# Patient Record
Sex: MALE | Race: WHITE | NOT HISPANIC OR LATINO | Employment: FULL TIME | URBAN - METROPOLITAN AREA
[De-identification: names, ages, dates, MRNs, and addresses within clinical notes are randomized per-mention and may not be internally consistent; named-entity substitution may affect disease eponyms.]

---

## 2020-12-02 ENCOUNTER — OFFICE VISIT (OUTPATIENT)
Dept: URGENT CARE | Age: 25
End: 2020-12-02
Payer: COMMERCIAL

## 2020-12-02 VITALS
BODY MASS INDEX: 19.88 KG/M2 | OXYGEN SATURATION: 99 % | HEIGHT: 73 IN | WEIGHT: 150 LBS | HEART RATE: 83 BPM | TEMPERATURE: 97.9 F | RESPIRATION RATE: 19 BRPM

## 2020-12-02 DIAGNOSIS — B34.9 ACUTE VIRAL SYNDROME: Primary | ICD-10-CM

## 2020-12-02 PROCEDURE — G0382 LEV 3 HOSP TYPE B ED VISIT: HCPCS | Performed by: FAMILY MEDICINE

## 2020-12-02 PROCEDURE — 87637 SARSCOV2&INF A&B&RSV AMP PRB: CPT

## 2020-12-05 LAB
FLUAV RNA NPH QL NAA+PROBE: NOT DETECTED
FLUBV RNA NPH QL NAA+PROBE: NOT DETECTED
RSV RNA NPH QL NAA+PROBE: NOT DETECTED
SARS-COV-2 RNA NPH QL NAA+PROBE: DETECTED

## 2022-03-03 ENCOUNTER — OFFICE VISIT (OUTPATIENT)
Dept: URGENT CARE | Age: 27
End: 2022-03-03
Payer: COMMERCIAL

## 2022-03-03 VITALS
BODY MASS INDEX: 18.55 KG/M2 | HEART RATE: 80 BPM | WEIGHT: 140 LBS | HEIGHT: 73 IN | TEMPERATURE: 98.6 F | RESPIRATION RATE: 22 BRPM | OXYGEN SATURATION: 100 %

## 2022-03-03 DIAGNOSIS — J06.9 VIRAL URI WITH COUGH: Primary | ICD-10-CM

## 2022-03-03 PROCEDURE — 99214 OFFICE O/P EST MOD 30 MIN: CPT | Performed by: FAMILY MEDICINE

## 2022-03-03 RX ORDER — METHYLPREDNISOLONE 4 MG/1
TABLET ORAL
Qty: 21 TABLET | Refills: 0 | Status: SHIPPED | OUTPATIENT
Start: 2022-03-03

## 2022-03-03 RX ORDER — BENZONATATE 200 MG/1
200 CAPSULE ORAL 3 TIMES DAILY PRN
Qty: 15 CAPSULE | Refills: 0 | Status: SHIPPED | OUTPATIENT
Start: 2022-03-03

## 2022-03-03 RX ORDER — BROMPHENIRAMINE MALEATE, PSEUDOEPHEDRINE HYDROCHLORIDE, AND DEXTROMETHORPHAN HYDROBROMIDE 2; 30; 10 MG/5ML; MG/5ML; MG/5ML
10 SYRUP ORAL 3 TIMES DAILY PRN
Qty: 200 ML | Refills: 0 | Status: SHIPPED | OUTPATIENT
Start: 2022-03-03

## 2022-03-03 NOTE — PROGRESS NOTES
[facility]        NAME: Rogelio Whitt is a 27 y o  male  : 1995    MRN: [id number]  DATE: March 3, 2022  TIME: 11:54 AM    Assessment and Plan   Viral URI with cough [J06 9]  1  Viral URI with cough  methylPREDNISolone 4 MG tablet therapy pack    brompheniramine-pseudoephedrine-DM 30-2-10 MG/5ML syrup    benzonatate (TESSALON) 200 MG capsule         Patient Instructions     Based on duration of symptoms COVID testing will not change treatment plan  Quarantine guidelines discussed  OTC supplements/medications discussed  Follow-up with PCP in the next 1-2 days for re-evaluation if symptoms continue or worsen  Go to the ED if any fevers, unable to stay hydrated, abdominal pain, chest pain, shortness of breath, wheezing, chest tightness, cough or cold symptoms, new or worsening symptoms or other concerning symptoms  Chief Complaint     Chief Complaint   Patient presents with    Cough     cough, congestion, fatigue, weakness and sinus pressure  pt has not feel well about one week  pt is not vaccinated  was postiv2020  History of Present Illness     Rogelio Whitt is a 27 y o  male presenting to the office today for upper respiratory complaints  Symptoms have been present for 7 days, and include cough, congestion, chills, fever, and muscle pain secondary to coughing  He has tried OTC decongestant and cough suppressants for his symptoms, with little relief  Sick contacts include: none  Recent travel: none    Review of Systems     Review of Systems   Constitutional: Positive for chills, fatigue and fever  HENT: Positive for congestion, postnasal drip and sore throat  Negative for ear discharge, ear pain, sinus pressure and sinus pain  Eyes: Negative for pain and discharge  Respiratory: Positive for cough  Negative for shortness of breath  Cardiovascular: Negative for chest pain and palpitations     Gastrointestinal: Negative for abdominal pain, diarrhea, nausea and vomiting  Genitourinary: Negative for difficulty urinating and dysuria  Musculoskeletal: Positive for myalgias  Negative for arthralgias  Skin: Negative for rash  Neurological: Negative for dizziness, syncope, light-headedness, numbness and headaches  Psychiatric/Behavioral: Negative for agitation  All other systems reviewed and are negative  Current Medications       Current Outpatient Medications:     benzonatate (TESSALON) 200 MG capsule, Take 1 capsule (200 mg total) by mouth 3 (three) times a day as needed for cough, Disp: 15 capsule, Rfl: 0    brompheniramine-pseudoephedrine-DM 30-2-10 MG/5ML syrup, Take 10 mL by mouth 3 (three) times a day as needed for cough or congestion, Disp: 200 mL, Rfl: 0    methylPREDNISolone 4 MG tablet therapy pack, Use as directed on package, Disp: 21 tablet, Rfl: 0    Current Allergies     Allergies as of 03/03/2022    (No Known Allergies)            The following portions of the patient's history were reviewed and updated as appropriate: allergies, current medications, past family history, past medical history, past social history, past surgical history and problem list      Past Medical History:   Diagnosis Date    Lyme disease 2016       History reviewed  No pertinent surgical history  History reviewed  No pertinent family history  Medications have been verified  Objective     Pulse 80   Temp 98 6 °F (37 °C)   Resp 22   Ht 6' 1" (1 854 m)   Wt 63 5 kg (140 lb)   SpO2 100%   BMI 18 47 kg/m²   No LMP for male patient  Physical Exam     Physical Exam  Vitals reviewed  Constitutional:       General: He is not in acute distress  Appearance: Normal appearance  He is not ill-appearing  HENT:      Head: Normocephalic and atraumatic  Right Ear: Tympanic membrane and ear canal normal  No middle ear effusion  Left Ear: Tympanic membrane and ear canal normal   No middle ear effusion        Mouth/Throat:      Mouth: Mucous membranes are moist       Pharynx: Posterior oropharyngeal erythema present  No oropharyngeal exudate  Tonsils: No tonsillar exudate  Eyes:      Extraocular Movements: Extraocular movements intact  Conjunctiva/sclera: Conjunctivae normal       Pupils: Pupils are equal, round, and reactive to light  Cardiovascular:      Rate and Rhythm: Normal rate and regular rhythm  Pulses: Normal pulses  Heart sounds: Normal heart sounds  No murmur heard  Pulmonary:      Effort: Pulmonary effort is normal  No respiratory distress  Breath sounds: Normal breath sounds  No wheezing  Abdominal:      General: Bowel sounds are normal  There is no distension  Palpations: Abdomen is soft  Tenderness: There is no abdominal tenderness  There is no guarding  Musculoskeletal:      Cervical back: Normal range of motion and neck supple  No tenderness  Lymphadenopathy:      Cervical: No cervical adenopathy  Skin:     General: Skin is warm  Neurological:      General: No focal deficit present  Mental Status: He is alert     Psychiatric:         Mood and Affect: Mood normal          Behavior: Behavior normal          Judgment: Judgment normal

## 2022-03-03 NOTE — LETTER
To whom it may concern,      Hector Collazo was seen in my office on 03/03/22  He may return to school/work after 10 days from the onset of symptoms (3/7/2022), as long as he remains fever free for 24 hours without the aid of any fever-reducing medication  Thank you!       Sincerely,    Bevely Overcast, DO

## 2022-03-03 NOTE — PATIENT INSTRUCTIONS
Upper Respiratory Infection   WHAT YOU NEED TO KNOW:   An upper respiratory infection is also called a cold  It can affect your nose, throat, ears, and sinuses  Cold symptoms are usually worst for the first 3 to 5 days  Most people get better in 7 to 14 days  You may continue to cough for 2 to 3 weeks  Colds are caused by viruses and do not get better with antibiotics  DISCHARGE INSTRUCTIONS:   Call your local emergency number (911 in the [facility] [facility]) if:   · You have chest pain or trouble breathing  Return to the emergency department if:   · You have a fever over 102ºF (39ºC)  Call your doctor if:   · You have a low fever  · Your sore throat gets worse or you see white or yellow spots in your throat  · Your symptoms get worse after 3 to 5 days or are not better in 14 days  · You have a rash anywhere on your skin  · You have large, tender lumps in your neck  · You have thick, green, or yellow drainage from your nose  · You cough up thick yellow, green, or bloody mucus  · You have a bad earache  · You have questions or concerns about your condition or care  Medicines: You may need any of the following:  · Decongestants  help reduce nasal congestion and help you breathe more easily  If you take decongestant pills, they may make you feel restless or cause problems with your sleep  Do not use decongestant sprays for more than a few days  · Cough suppressants  help reduce coughing  Ask your healthcare provider which type of cough medicine is best for you  · NSAIDs , such as ibuprofen, help decrease swelling, pain, and fever  NSAIDs can cause stomach bleeding or kidney problems in certain people  If you take blood thinner medicine, always ask your healthcare provider if NSAIDs are safe for you  Always read the medicine label and follow directions  · Acetaminophen  decreases pain and fever  It is available without a doctor's order  Ask how much to take and how often to take it  Follow directions  Read the labels of all other medicines you are using to see if they also contain acetaminophen, or ask your doctor or pharmacist  Acetaminophen can cause liver damage if not taken correctly  Do not use more than 4 grams (4,000 milligrams) total of acetaminophen in one day  · Take your medicine as directed  Contact your healthcare provider if you think your medicine is not helping or if you have side effects  Tell him or her if you are allergic to any medicine  Keep a list of the medicines, vitamins, and herbs you take  Include the amounts, and when and why you take them  Bring the list or the pill bottles to follow-up visits  Carry your medicine list with you in case of an emergency  Self-care:   · Rest as much as possible  Slowly start to do more each day  · Drink more liquids as directed  Liquids will help thin and loosen mucus so you can cough it up  Liquids will also help prevent dehydration  Liquids that help prevent dehydration include water, fruit juice, and broth  Do not drink liquids that contain caffeine  Caffeine can increase your risk for dehydration  Ask your healthcare provider how much liquid to drink each day  · Soothe a sore throat  Gargle with warm salt water  Make salt water by dissolving ¼ teaspoon salt in 1 cup warm water  You may also suck on hard candy or throat lozenges  You may use a sore throat spray  · Use a humidifier or vaporizer  Use a cool mist humidifier or a vaporizer to increase air moisture in your home  This may make it easier for you to breathe and help decrease your cough  · Use saline nasal drops as directed  These help relieve congestion  · Apply petroleum-based jelly around the outside of your nostrils  This can decrease irritation from blowing your nose  · Do not smoke  Nicotine and other chemicals in cigarettes and cigars can make your symptoms worse  They can also cause infections such as bronchitis or pneumonia   Ask your healthcare provider for information if you currently smoke and need help to quit  E-cigarettes or smokeless tobacco still contain nicotine  Talk to your healthcare provider before you use these products  Prevent a cold:   · Wash your hands often  Use soap and water every time you wash your hands  Rub your soapy hands together, lacing your fingers  Use the fingers of one hand to scrub under the nails of the other hand  Wash for at least 20 seconds  Rinse with warm, running water for several seconds  Then dry your hands  Use germ-killing gel if soap and water are not available  Do not touch your eyes or mouth without washing your hands first          · Cover a sneeze or cough  Use a tissue that covers your mouth and nose  Put the used tissue in the trash right away  Use the bend of your arm if a tissue is not available  Wash your hands well with soap and water or use a hand   Do not stand close to anyone who is sneezing or coughing  · Try to stay away from others while you are sick  This is especially important during the first 2 to 3 days when the virus is more easily spread  Wait until a fever, cough, or other symptoms are gone before you return to work or other regular activities  · Do not share items while you are sick  This includes food, drinks, eating utensils, and dishes  Follow up with your doctor as directed:  Write down your questions so you remember to ask them during your visits  © Copyright [employer] 2022 Information is for End User's use only and may not be sold, redistributed or otherwise used for commercial purposes  All illustrations and images included in CareNotes® are the copyrighted property of A D A M [employer] Inc  or Ellie Burrell  The above information is an  only  It is not intended as medical advice for individual conditions or treatments   Talk to your doctor, nurse or pharmacist before following any medical regimen to see if it is safe and effective for you

## 2025-01-08 ENCOUNTER — HOSPITAL ENCOUNTER (EMERGENCY)
Facility: HOSPITAL | Age: 30
Discharge: HOME | End: 2025-01-08
Attending: EMERGENCY MEDICINE

## 2025-01-08 VITALS
DIASTOLIC BLOOD PRESSURE: 87 MMHG | SYSTOLIC BLOOD PRESSURE: 150 MMHG | RESPIRATION RATE: 18 BRPM | OXYGEN SATURATION: 96 % | HEART RATE: 92 BPM | TEMPERATURE: 99.3 F

## 2025-01-08 DIAGNOSIS — K05.10 GINGIVITIS: Primary | ICD-10-CM

## 2025-01-08 DIAGNOSIS — J06.9 URI (UPPER RESPIRATORY INFECTION): ICD-10-CM

## 2025-01-08 PROCEDURE — 99284 EMERGENCY DEPT VISIT MOD MDM: CPT | Performed by: EMERGENCY MEDICINE

## 2025-01-08 PROCEDURE — 99282 EMERGENCY DEPT VISIT SF MDM: CPT

## 2025-01-08 PROCEDURE — 96372 THER/PROPH/DIAG INJ SC/IM: CPT

## 2025-01-08 RX ORDER — ACETAMINOPHEN 325 MG/1
650 TABLET ORAL ONCE
Status: COMPLETED | OUTPATIENT
Start: 2025-01-08 | End: 2025-01-08

## 2025-01-08 RX ORDER — CHLORHEXIDINE GLUCONATE ORAL RINSE 1.2 MG/ML
15 SOLUTION DENTAL 2 TIMES DAILY
Qty: 120 ML | Refills: 0 | Status: SHIPPED | OUTPATIENT
Start: 2025-01-08

## 2025-01-08 RX ORDER — KETOROLAC TROMETHAMINE 30 MG/ML
15 INJECTION, SOLUTION INTRAMUSCULAR; INTRAVENOUS ONCE
Status: COMPLETED | OUTPATIENT
Start: 2025-01-08 | End: 2025-01-08

## 2025-01-08 RX ADMIN — KETOROLAC TROMETHAMINE 15 MG: 30 INJECTION, SOLUTION INTRAMUSCULAR; INTRAVENOUS at 22:54

## 2025-01-08 RX ADMIN — ACETAMINOPHEN 650 MG: 325 TABLET, FILM COATED ORAL at 22:54

## 2025-01-09 NOTE — ED ATTENDING ATTESTATION
"I saw and evaluated the patient. I have discussed the patient with the resident physician and agree with the resident's findings, assessment and plan as documented in the resident physician's note, unless otherwise documented below. All available laboratory and imaging studies were reviewed by myself.  I was present for key portions of any procedure(s) performed by the resident and I was immediately available to provide assistance.     I agree with the current assessment done in the Emergency Department. I have conducted an independent evaluation of this patient    Final Diagnosis:  1. Gingivitis    2. URI (upper respiratory infection)            Chief Complaint   Patient presents with    Oral Swelling     Pt states the roof of his mouth and under his tongue has been swollen and numb since Saturday. Pt denies trouble breathing. Pt states he has had a tooth infection \"on and off\" and unsure if it is related.      This is a 29 y.o. male presenting for gingival pain and swelling as well as cough, congestion, myalgias, sore throat over, headaches Symptoms started over the past few days. Denies fever, chills, cough, chest pain, SOB, n/v/d, abdominal pain, any other complaints.      PMH:   has a past medical history of Lyme disease (2016).    PSH:   has no past surgical history on file.    Social:  Social History     Substance and Sexual Activity   Alcohol Use Yes    Comment: rarely     Social History     Tobacco Use   Smoking Status Former   Smokeless Tobacco Never     Social History     Substance and Sexual Activity   Drug Use Not Currently     PE:  Vitals:    01/08/25 2010   BP: 150/87   BP Location: Left arm   Pulse: 92   Resp: 18   Temp: 99.3 °F (37.4 °C)   TempSrc: Temporal   SpO2: 96%         Physical exam:  GENERAL APPEARANCE: Appears comfortable, no acute distress, calm and cooperative   NEURO: GCS 15, no gross focal deficits, cranial nerves grossly intact, clear fluent speech, no facial asymmetry   HEENT: Poor " dentition. No significant gingival swelling, erythema, or periapical abscess. Mild oropharyngeal erythema without exudates. No tonsillar swelling.  Uvula midline.  No trismus.  No drooling. No peritonsillar abscess.  Normocephalic, atraumatic, moist mucous membranes   Neck: Supple, full ROM  CV: RRR, no murmurs, rubs, or gallops  LUNGS: CTAB, no wheezing, rales, or rhonchi  GI: Abdomen soft, non-tender, no rebound or guarding   MSK: Extremities non-tender, no pitting edema  SKIN: Warm and dry      Assessment and plan: Patient with URI symptoms and gingival discomfort. Do not feel antibiotics are warranted based on current presentation. Advise dental follow up, supportive care. Strict ED return precautions provided should symptoms worsen and patient can otherwise follow up outpatient. Patient expresses an understanding and agreement with the plan and remains in good condition for discharge.     Code Status: No Order  Advance Directive and Living Will:      Power of :    POLST:      Medications   ketorolac (TORADOL) injection 15 mg (15 mg Intramuscular Given 1/8/25 2254)   acetaminophen (TYLENOL) tablet 650 mg (650 mg Oral Given 1/8/25 2254)     No orders to display     Orders Placed This Encounter   Procedures    Ambulatory Referral to Dentistry     Labs Reviewed - No data to display      Time reflects when diagnosis was documented in both MDM as applicable and the Disposition within this note       Time User Action Codes Description Comment    1/8/2025 10:07 PM Richie Wright Add [K05.10] Gingivitis     1/9/2025 12:51 PM Kriss Sterling Add [J06.9] URI (upper respiratory infection)           ED Disposition       ED Disposition   Discharge    Condition   Stable    Date/Time   Wed Jan 8, 2025 10:07 PM    Comment   Doc Villagran discharge to home/self care.                   Follow-up Information       Follow up With Specialties Details Why Contact Info    Hang Smith MD Family Medicine   [address]  [place] [address]  [phone]      [facility]  Call   [address]  [place] [address]  [phone]          Discharge Medication List as of 1/8/2025 10:51 PM        START taking these medications    Details   chlorhexidine (PERIDEX) 0.12 % solution Apply 15 mL to the mouth or throat 2 (two) times a day, Starting Wed 1/8/2025, Normal           CONTINUE these medications which have NOT CHANGED    Details   benzonatate (TESSALON) 200 MG capsule Take 1 capsule (200 mg total) by mouth 3 (three) times a day as needed for cough, Starting Thu 3/3/2022, Normal      brompheniramine-pseudoephedrine-DM 30-2-10 MG/5ML syrup Take 10 mL by mouth 3 (three) times a day as needed for cough or congestion, Starting Thu 3/3/2022, Normal      methylPREDNISolone 4 MG tablet therapy pack Use as directed on package, Normal             Prior to Admission Medications   Prescriptions Last Dose Informant Patient Reported? Taking?   benzonatate (TESSALON) 200 MG capsule   No No   Sig: Take 1 capsule (200 mg total) by mouth 3 (three) times a day as needed for cough   brompheniramine-pseudoephedrine-DM 30-2-10 MG/5ML syrup   No No   Sig: Take 10 mL by mouth 3 (three) times a day as needed for cough or congestion   methylPREDNISolone 4 MG tablet therapy pack   No No   Sig: Use as directed on package      Facility-Administered Medications: None         Portions of the record may have been created with voice recognition software. Occasional wrong word or \"sound a like\" substitutions may have occurred due to the inherent limitations of voice recognition software. Read the chart carefully and recognize, using context, where substitutions have occurred.    Electronically signed by:  Kriss Sterling    "

## 2025-01-09 NOTE — ED PROVIDER NOTES
Time reflects when diagnosis was documented in both MDM as applicable and the Disposition within this note       Time User Action Codes Description Comment    1/8/2025 10:07 PM Richie Wright Add [K05.10] Gingivitis           ED Disposition       ED Disposition   Discharge    Condition   Stable    Date/Time   Wed Jan 8, 2025 10:07 PM    Comment   Doc Villagran discharge to home/self care.                   Assessment & Plan       Medical Decision Making  Overall well-appearing 29-year-old male presenting with signs and symptoms consistent with viral syndrome and gingivitis.  No specific tooth pain, doubt periapical abscess.  No trismus, no concern for Milo's or other significant deep space infection of the mouth.  No meningismus, severe headache, altered mental status, doubt severe bacterial infection, likely viral infection.  Will recommend patient take Tylenol and Motrin for myalgias and fevers, Peridex mouthwash for gingivitis, and follow-up with PCP and dentist.  Return precautions discussed, all questions answered prior to discharge.    Risk  OTC drugs.  Prescription drug management.             Medications   ketorolac (TORADOL) injection 15 mg (15 mg Intramuscular Given 1/8/25 2254)   acetaminophen (TYLENOL) tablet 650 mg (650 mg Oral Given 1/8/25 2254)       ED Risk Strat Scores                          SBIRT 22yo+      Flowsheet Row Most Recent Value   Initial Alcohol Screen: US AUDIT-C     1. How often do you have a drink containing alcohol? 0 Filed at: 01/08/2025 2011   2. How many drinks containing alcohol do you have on a typical day you are drinking?  0 Filed at: 01/08/2025 2011   3a. Male UNDER 65: How often do you have five or more drinks on one occasion? 0 Filed at: 01/08/2025 2011   Audit-C Score 0 Filed at: 01/08/2025 2011   AUBREE: How many times in the past year have you...    Used an illegal drug or used a prescription medication for non-medical reasons? Never Filed at: 01/08/2025 2011  "                           History of Present Illness       Chief Complaint   Patient presents with    Oral Swelling     Pt states the roof of his mouth and under his tongue has been swollen and numb since Saturday. Pt denies trouble breathing. Pt states he has had a tooth infection \"on and off\" and unsure if it is related.        Past Medical History:   Diagnosis Date    Lyme disease 2016      History reviewed. No pertinent surgical history.   History reviewed. No pertinent family history.   Social History     Tobacco Use    Smoking status: Former    Smokeless tobacco: Never   Substance Use Topics    Alcohol use: Yes     Comment: rarely    Drug use: Not Currently      E-Cigarette/Vaping      E-Cigarette/Vaping Substances      I have reviewed and agree with the history as documented.     Patient is a 29-year-old male with without significant past medical history presenting with multiple complaints.  Patient notes sore throat, congestion, myalgias, chills over the last few days, and additionally notes tooth pain.  Patient notes generally poor dentition as well.        Review of Systems   All other systems reviewed and are negative.          Objective       ED Triage Vitals [01/08/25 2010]   Temperature Pulse Blood Pressure Respirations SpO2 Patient Position - Orthostatic VS   99.3 °F (37.4 °C) 92 150/87 18 96 % Sitting      Temp Source Heart Rate Source BP Location FiO2 (%) Pain Score    Temporal Monitor Left arm -- --      Vitals      Date and Time Temp Pulse SpO2 Resp BP Pain Score FACES Pain Rating User   01/08/25 2010 99.3 °F (37.4 °C) 92 96 % 18 150/87 -- -- KH            Physical Exam  Vitals and nursing note reviewed.   Constitutional:       General: He is not in acute distress.     Appearance: He is not ill-appearing.   HENT:      Head: Normocephalic and atraumatic.      Nose: Nose normal.      Mouth/Throat:      Mouth: Mucous membranes are moist.      Pharynx: Oropharynx is clear.      Comments: Poor " dentition, no specific isolated tooth tenderness  Cardiovascular:      Rate and Rhythm: Normal rate and regular rhythm.   Pulmonary:      Effort: Pulmonary effort is normal.      Breath sounds: Normal breath sounds.   Abdominal:      General: Abdomen is flat. Bowel sounds are normal.      Palpations: Abdomen is soft.   Musculoskeletal:         General: Normal range of motion.      Cervical back: Normal range of motion.   Skin:     General: Skin is warm and dry.   Neurological:      General: No focal deficit present.      Mental Status: He is alert and oriented to person, place, and time.      Cranial Nerves: No cranial nerve deficit.      Sensory: No sensory deficit.      Motor: No weakness.      Gait: Gait normal.   Psychiatric:         Mood and Affect: Mood normal.         Results Reviewed       None            No orders to display       Procedures    ED Medication and Procedure Management   Prior to Admission Medications   Prescriptions Last Dose Informant Patient Reported? Taking?   benzonatate (TESSALON) 200 MG capsule   No No   Sig: Take 1 capsule (200 mg total) by mouth 3 (three) times a day as needed for cough   brompheniramine-pseudoephedrine-DM 30-2-10 MG/5ML syrup   No No   Sig: Take 10 mL by mouth 3 (three) times a day as needed for cough or congestion   methylPREDNISolone 4 MG tablet therapy pack   No No   Sig: Use as directed on package      Facility-Administered Medications: None     Patient's Medications   Discharge Prescriptions    CHLORHEXIDINE (PERIDEX) 0.12 % SOLUTION    Apply 15 mL to the mouth or throat 2 (two) times a day       Start Date: 1/8/2025  End Date: --       Order Dose: 15 mL       Quantity: 120 mL    Refills: 0       ED SEPSIS DOCUMENTATION   Time reflects when diagnosis was documented in both MDM as applicable and the Disposition within this note       Time User Action Codes Description Comment    1/8/2025 10:07 PM Richie Wright Add [K05.10] Gingivitis                  Richie  MD Kyle  01/08/25 [id number]

## 2025-01-09 NOTE — DISCHARGE INSTRUCTIONS
You were evaluated in the emergency department for chills, myalgias, mouth pain.  Your mouth pain is consistent with gingivitis.  Use the prescribed mouthwash for the next week only.  Do not use it for longer than a week as it can cause tooth discoloration.  Follow-up with dentistry.    Otherwise, follow-up with your primary care provider.  Take Tylenol and Motrin at home for chills and pain.  Return to the emergency department if your symptoms severely worsen.